# Patient Record
(demographics unavailable — no encounter records)

---

## 2025-02-06 NOTE — PHYSICAL EXAM
[Well Developed] : well developed [No Acute Distress] : no acute distress [Normal Conjunctiva] : normal conjunctiva [No Carotid Bruit] : no carotid bruit [Normal S1, S2] : normal S1, S2 [No Murmur] : no murmur [Clear Lung Fields] : clear lung fields [Good Air Entry] : good air entry [No Respiratory Distress] : no respiratory distress  [Soft] : abdomen soft [Non Tender] : non-tender [Normal Gait] : normal gait [No Edema] : no edema [No Rash] : no rash [Moves all extremities] : moves all extremities [No Focal Deficits] : no focal deficits [Alert and Oriented] : alert and oriented

## 2025-02-06 NOTE — HISTORY OF PRESENT ILLNESS
[FreeTextEntry1] : 54 F Manpreet Smiley's niece.  Family history of coronary disease and borderline lipids.  Intermittent left-sided chest pressure / dull ache, left arm pain and palpitations for a few months.  Sometimes associated with stress.   2/2025 EKG:  NSR, no ST-T changes